# Patient Record
Sex: FEMALE | Race: WHITE
[De-identification: names, ages, dates, MRNs, and addresses within clinical notes are randomized per-mention and may not be internally consistent; named-entity substitution may affect disease eponyms.]

---

## 2019-01-10 NOTE — CT
CHEST CT SCAN WITH IV CONTRAST:

 

History: R91.1 lung nodule. Patient indicates lung nodule was diagnosed [place] ago [place]. No old studies available for comparison. 

 

FINDINGS: 

No mediastinal mass or adenopathy. Scattered small bolus emphysema changes, primarily in the upper lo
bes. Small pleural based nodule 0.4 cm in the left lower lobe. Minimal pleural based parenchymal carey
ges in the right middle lobe and lingula having more of a chronic appearance. No mediastinal mass or 
adenopathy. No pleural effusion or pericardial effusion. Several bilateral nonobstructing small renal
 calculi. 

 

IMPRESSION: 

0.4 cm diameter somewhat flat based, pleural based nodule in the left lower lobe. Small nonobstructin
g bilateral renal calculi. Minimal pleural based parenchymal change in the lingula and right middle l
obe, evidence for mild scarring. Numerous small bilateral upper lobe bullae. 

 

Consider a one year follow up examination in regards to the small pleural based nodule.

 

POS: [facility]

## 2019-01-28 NOTE — MMO
BILATERAL SCREENING MAMMOGRAM:

 

Indication: Annual exam. 

 

Comparison: 6-30-15

 

FINDINGS: 

This study is interpreted with the assistance of computer aided detection. 

 

The breast parenchyma demonstrates scattered fibroglandular elements. 

 

There are benign densities within the left breast which are stable. 

 

No new suspicious mass, cluster of microcalcification, or area of architectural distortion is evident
. 

 

IMPRESSION: 

BIRADS category 2 - benign. Recommend routine annual mammographic screening. 

 

POS: [facility]

## 2019-07-11 ENCOUNTER — HOSPITAL ENCOUNTER (OUTPATIENT)
Dept: HOSPITAL 92 - ERS | Age: 59
Setting detail: OBSERVATION
LOS: 1 days | Discharge: HOME | End: 2019-07-12
Attending: INTERNAL MEDICINE | Admitting: INTERNAL MEDICINE
Payer: COMMERCIAL

## 2019-07-11 VITALS — BODY MASS INDEX: 33.7 KG/M2

## 2019-07-11 DIAGNOSIS — K04.7: ICD-10-CM

## 2019-07-11 DIAGNOSIS — G89.29: ICD-10-CM

## 2019-07-11 DIAGNOSIS — J44.9: ICD-10-CM

## 2019-07-11 DIAGNOSIS — F32.9: ICD-10-CM

## 2019-07-11 DIAGNOSIS — Z88.8: ICD-10-CM

## 2019-07-11 DIAGNOSIS — M54.9: ICD-10-CM

## 2019-07-11 DIAGNOSIS — Z88.5: ICD-10-CM

## 2019-07-11 DIAGNOSIS — R07.89: Primary | ICD-10-CM

## 2019-07-11 DIAGNOSIS — F17.210: ICD-10-CM

## 2019-07-11 DIAGNOSIS — K21.9: ICD-10-CM

## 2019-07-11 DIAGNOSIS — I10: ICD-10-CM

## 2019-07-11 DIAGNOSIS — D75.1: ICD-10-CM

## 2019-07-11 DIAGNOSIS — Z79.2: ICD-10-CM

## 2019-07-11 LAB
ALBUMIN SERPL BCG-MCNC: 4.4 G/DL (ref 3.5–5)
ALP SERPL-CCNC: 81 U/L (ref 40–150)
ALT SERPL W P-5'-P-CCNC: 28 U/L (ref 8–55)
ANION GAP SERPL CALC-SCNC: 14 MMOL/L (ref 10–20)
AST SERPL-CCNC: 21 U/L (ref 5–34)
BASOPHILS # BLD AUTO: 0.1 THOU/UL (ref 0–0.2)
BASOPHILS NFR BLD AUTO: 0.9 % (ref 0–1)
BILIRUB SERPL-MCNC: 0.5 MG/DL (ref 0.2–1.2)
BUN SERPL-MCNC: 17 MG/DL (ref 9.8–20.1)
CALCIUM SERPL-MCNC: 9.9 MG/DL (ref 7.8–10.44)
CHLORIDE SERPL-SCNC: 108 MMOL/L (ref 98–107)
CK SERPL-CCNC: 136 U/L (ref 29–168)
CO2 SERPL-SCNC: 20 MMOL/L (ref 22–29)
CREAT CL PREDICTED SERPL C-G-VRATE: 0 ML/MIN (ref 70–130)
EOSINOPHIL # BLD AUTO: 0.1 THOU/UL (ref 0–0.7)
EOSINOPHIL NFR BLD AUTO: 0.6 % (ref 0–10)
GLOBULIN SER CALC-MCNC: 3 G/DL (ref 2.4–3.5)
GLUCOSE SERPL-MCNC: 124 MG/DL (ref 70–105)
HGB BLD-MCNC: 16.1 G/DL (ref 12–16)
LIPASE SERPL-CCNC: 28 U/L (ref 8–78)
LYMPHOCYTES # BLD: 2.2 THOU/UL (ref 1.2–3.4)
LYMPHOCYTES NFR BLD AUTO: 23.4 % (ref 21–51)
MCH RBC QN AUTO: 33 PG (ref 27–31)
MCV RBC AUTO: 101 FL (ref 78–98)
MONOCYTES # BLD AUTO: 0.8 THOU/UL (ref 0.11–0.59)
MONOCYTES NFR BLD AUTO: 8.7 % (ref 0–10)
NEUTROPHILS # BLD AUTO: 6.2 THOU/UL (ref 1.4–6.5)
NEUTROPHILS NFR BLD AUTO: 66.5 % (ref 42–75)
PLATELET # BLD AUTO: 314 THOU/UL (ref 130–400)
POTASSIUM SERPL-SCNC: 3.9 MMOL/L (ref 3.5–5.1)
RBC # BLD AUTO: 4.86 MILL/UL (ref 4.2–5.4)
SODIUM SERPL-SCNC: 138 MMOL/L (ref 136–145)
TROPONIN I SERPL DL<=0.01 NG/ML-MCNC: (no result) NG/ML (ref ?–0.03)
TROPONIN I SERPL DL<=0.01 NG/ML-MCNC: 0.01 NG/ML (ref ?–0.03)
WBC # BLD AUTO: 9.4 THOU/UL (ref 4.8–10.8)

## 2019-07-11 PROCEDURE — 83880 ASSAY OF NATRIURETIC PEPTIDE: CPT

## 2019-07-11 PROCEDURE — 78452 HT MUSCLE IMAGE SPECT MULT: CPT

## 2019-07-11 PROCEDURE — 82550 ASSAY OF CK (CPK): CPT

## 2019-07-11 PROCEDURE — 80053 COMPREHEN METABOLIC PANEL: CPT

## 2019-07-11 PROCEDURE — 94640 AIRWAY INHALATION TREATMENT: CPT

## 2019-07-11 PROCEDURE — 80061 LIPID PANEL: CPT

## 2019-07-11 PROCEDURE — 36415 COLL VENOUS BLD VENIPUNCTURE: CPT

## 2019-07-11 PROCEDURE — 94760 N-INVAS EAR/PLS OXIMETRY 1: CPT

## 2019-07-11 PROCEDURE — 80048 BASIC METABOLIC PNL TOTAL CA: CPT

## 2019-07-11 PROCEDURE — 84484 ASSAY OF TROPONIN QUANT: CPT

## 2019-07-11 PROCEDURE — A9500 TC99M SESTAMIBI: HCPCS

## 2019-07-11 PROCEDURE — 85025 COMPLETE CBC W/AUTO DIFF WBC: CPT

## 2019-07-11 PROCEDURE — 93017 CV STRESS TEST TRACING ONLY: CPT

## 2019-07-11 PROCEDURE — 93005 ELECTROCARDIOGRAM TRACING: CPT

## 2019-07-11 PROCEDURE — 71045 X-RAY EXAM CHEST 1 VIEW: CPT

## 2019-07-11 PROCEDURE — G0378 HOSPITAL OBSERVATION PER HR: HCPCS

## 2019-07-11 PROCEDURE — 83690 ASSAY OF LIPASE: CPT

## 2019-07-11 NOTE — HP
PRIMARY CARE PHYSICIAN:  Joceline Locke NP



CHIEF COMPLAINT:  "Sporadic high blood pressure."



HISTORY OF PRESENT ILLNESS:  This is a 58-year-old female with history of COPD,

GERD, back pain, tobacco use, who presents to the emergency room with a 
complaint of

elevated blood pressures sporadically along with chest pain and pressure.  The

patient reports that she was notified by her dentist 2 weeks ago that her blood

pressures were elevated.  Since then, she has had episodes of systolic pressure 
of

175 up to the 190s, intermittently associated with nausea, chest pain and 
pressure

in the center of her chest that radiates across her chest, left arm pain, and

shortness of breath.  She states these episodes of chest pressure lasts about 30

minutes, occur 4 to 5 times a day, and are improved with aspirin, which she is 
in

average taking it one time per day.  The pain/pressure is rated 3/10 in 
quality.  She denies any precipitating factors, denies

any other relieving factors.  She does state that one night she was unable to 
fall

asleep secondary to the pressure.  She denies any prior history of similar 
symptoms,

does state she had a cardiac evaluation 10 years ago and describes a 
catheterization

that she states was normal.  She also describes a stress test that she had

difficulty completing secondary to shortness of breath associated with her 
COPD. 



The patient denies any fevers, vomiting, or abdominal pain, denies any recent

coughing.  She is being treated for dental infections with amoxicillin and 
states

that the tooth pain has resolved.  She has had chills, headaches that she 
describes as

severe, lightheadedness, and dyspnea on exertion with hills or stairs. 



In the emergency room, the patient's presentation concerning for cardiac 
etiology of

the chest pain, she received aspirin 325 mg and hospitalist called for 
admission. 



ALLERGIES TO MEDICINE:  Codeine and Advair.  She states she had swollen vocal 
cords

associated with this.  In the emergency room chart, it states albuterol; however
,

she does state she has a ProAir inhaler and has no problems with it. 



CURRENT MEDICATIONS:  Reconciled with the list.  She reports she has been off 
these

medications for 3 weeks due to concerns of interactions with her dental 
medications. 

1. Esomeprazole 40 mg daily.

2. Spiriva daily.

3. Tizanidine 4 mg t.i.d.

4. Trazodone 50 mg at bedtime.

5. ProAir as needed, which she was using 4 times per day.

6. Her dental medications are amoxicillin 875 mg one-half tablet twice a day.

7. Tramadol, although she denies any need for this now.



PAST MEDICAL HISTORY:  

1. Lung nodule on the left side.

2. Dental problems including infection for which she is on amoxicillin.

3. COPD, uncertain of stage.

4. GERD.

5. Back pain.



PAST SURGICAL HISTORY:  

1. Hysterectomy.

2. Bladder suspension.



SOCIAL HISTORY:  She is , her  is her surrogate decision maker.  
She

is a full code.  She uses tobacco less than one pack per day and has been 
smoking

since the age of 13, she denies any recent marijuana use, although has used it 
in

the past.  She also denies any recent alcohol use. 



FAMILY HISTORY:  Diabetes, lung cancer, and high blood pressure.



REVIEW OF SYSTEMS:  Positive for chills, headache, dyspnea on exertion.  
Negative

for fevers, vomiting, abdominal pain, coughing, and any current dental pain.  
All

remaining review of systems are reviewed and negative.  Daily reflux symptoms. 



PHYSICAL EXAMINATION:

VITAL SIGNS:  Blood pressure is 174/107, pulse 74, respirations 16, sats 94% on 
room

air. 

GENERAL:  Awake, alert, responsive, in no apparent distress.  Able to speak in 
full

sentences. 

HEENT:  Her pupils are equal and round.  Oral mucosa is pink and moist. 

NECK:  Supple.  Nontender. 

LYMPHATICS:  No palpable cervical or supraclavicular lymphadenopathy. 

LUNGS:  Clear to auscultation bilateral. 

HEART:  Normal S1, S2.  Regular rate and rhythm.  No significant murmur. 

ABDOMEN:  Soft, present bowel sounds.  Nontender.  Nondistended. 

EXTREMITIES:  No pitting edema, clubbing, or cyanosis. 

SKIN:  No visible rashes. 

NEUROLOGIC:  No gross deficits. 

PSYCH:  Euthymic.  Alert and oriented x4. 

VASCULAR:  2+ dorsalis pedis pulses bilateral.  No audible carotid bruits.



KEY FINDINGS AND TEST RESULTS:  EKG; sinus rhythm, normal axis, normal intervals
, abnormal R-wave

progression, no ST changes. 



Chest x-ray is personally reviewed.  No acute findings. 



CBC; 9.4, 16.1, 49.2, 314. 



Chemistry; 138, 3.9, 108, 20, 17, 0.77, 124. 



LFTs negative. 



Troponin x2 are negative.



IMPRESSION:  

1. Chest pressure concerning for unstable angina in a patient with risk

factors of tobacco use and high blood pressure. 

2.  Hypertension - new diagnosis.

3. Chronic obstructive pulmonary disease, no signs of an acute exacerbation.

4. Dental infections, on antibiotic therapy.

5. Chronic back pain.

6. Gastroesophageal reflux disease.

7. Polycythemia, mild.



PLAN:  

1. Observation status in the hospital.

2. Telemetry monitoring.

3. We will initiate lisinopril for the elevated blood pressures, holding on a 
beta

blocker as a stress test will be ordered for tomorrow, with the caveat that the

patient does have COPD. Also start nitropaste for both symptoms management and 
blood pressure control.

4. We will also order echocardiogram to further evaluate the patient's cardiac

function.  If any tests are abnormal, we will need Cardiology consultation. 

5. Continuing the daily aspirin, check a lipid panel in the morning.

6. Obtain a third troponin.

7. We will resume her PPI, order Spiriva and p.r.n. albuterol.

8. We will hold her other medications for now except for her amoxicillin for the

dental infection. 

9. DVT prophylaxis.  She is ambulatory.

10. GI prophylaxis not indicated.

11. Code status is full.  Surrogate decision maker is the patient's .

12. Tobacco cessation counseling prior to discharge.

13. Anticipated length of stay is for the above workup, which is anticipated 
less

than 48 hours. 



The patient is at high risk given age, comorbidities, and current presentation.



Reviewed the plan of care with the patient who demonstrates understanding, no

questions or further needs at the end of evaluation. 







Job ID:  [id number]



[facility] HospitalDECLAN

## 2019-07-11 NOTE — RAD
PA CHEST:

 

Date:  07/11/19 

 

HISTORY:  

Chest pain. 

 

FINDINGS:

Lung fields are clear. Heart and mediastinum appear normal. 

 

IMPRESSION: 

No acute findings.  

 

POS: [facility]

## 2019-07-12 VITALS — TEMPERATURE: 97.9 F | DIASTOLIC BLOOD PRESSURE: 81 MMHG | SYSTOLIC BLOOD PRESSURE: 141 MMHG

## 2019-07-12 LAB
ANION GAP SERPL CALC-SCNC: 12 MMOL/L (ref 10–20)
BUN SERPL-MCNC: 21 MG/DL (ref 9.8–20.1)
CALCIUM SERPL-MCNC: 9.1 MG/DL (ref 7.8–10.44)
CHD RISK SERPL-RTO: 5.7 (ref ?–4.5)
CHLORIDE SERPL-SCNC: 107 MMOL/L (ref 98–107)
CHOLEST SERPL-MCNC: 198 MG/DL
CO2 SERPL-SCNC: 24 MMOL/L (ref 22–29)
CREAT CL PREDICTED SERPL C-G-VRATE: 132 ML/MIN (ref 70–130)
GLUCOSE SERPL-MCNC: 103 MG/DL (ref 70–105)
HDLC SERPL-MCNC: 35 MG/DL
LDLC SERPL CALC-MCNC: 142 MG/DL
POTASSIUM SERPL-SCNC: 3.8 MMOL/L (ref 3.5–5.1)
SODIUM SERPL-SCNC: 139 MMOL/L (ref 136–145)
TRIGL SERPL-MCNC: 103 MG/DL (ref ?–150)

## 2019-07-12 RX ADMIN — IPRATROPIUM BROMIDE SCH: 0.5 SOLUTION RESPIRATORY (INHALATION) at 13:04

## 2019-07-12 RX ADMIN — IPRATROPIUM BROMIDE SCH ML: 0.5 SOLUTION RESPIRATORY (INHALATION) at 00:10

## 2019-07-12 RX ADMIN — NITROGLYCERIN SCH: 20 OINTMENT TOPICAL at 06:04

## 2019-07-12 RX ADMIN — IPRATROPIUM BROMIDE SCH ML: 0.5 SOLUTION RESPIRATORY (INHALATION) at 07:08

## 2019-07-12 RX ADMIN — NITROGLYCERIN SCH: 20 OINTMENT TOPICAL at 14:27

## 2019-07-12 NOTE — NM
EXAM: CARDIAC SPECT



HISTORY: Chest pain



TECHNIQUE: A myocardial perfusion scan was performed using the single isotope 1 day protocol with davon
hnetium 99m sestamibi. [10 mCi] was injected intravenously for the rest exam followed by 30 mCi for

the stress study. 



Pharmacologic stress with Lexiscan was monitored and interpreted by FILOMENA Jason



FINDINGS: Homogeneous tracer distribution is seen in the myocardial segments on stress and rest image
s without fixed or reversible defects.





Gated SPECT LVEF: 63%



Wall motion exam: Normal





IMPRESSION: Normal myocardial perfusion scan



Reported By: Vahe Bender 

Electronically Signed:  7/12/2019 12:40 PM

## 2019-07-22 ENCOUNTER — HOSPITAL ENCOUNTER (OUTPATIENT)
Dept: HOSPITAL 92 - ULT | Age: 59
Discharge: HOME | End: 2019-07-22
Attending: NURSE PRACTITIONER
Payer: COMMERCIAL

## 2019-07-22 DIAGNOSIS — I07.1: ICD-10-CM

## 2019-07-22 DIAGNOSIS — R07.9: Primary | ICD-10-CM

## 2019-07-22 PROCEDURE — 93306 TTE W/DOPPLER COMPLETE: CPT

## 2019-11-14 ENCOUNTER — HOSPITAL ENCOUNTER (EMERGENCY)
Dept: HOSPITAL 92 - SCSER | Age: 59
Discharge: HOME | End: 2019-11-14
Payer: COMMERCIAL

## 2019-11-14 DIAGNOSIS — S00.11XA: ICD-10-CM

## 2019-11-14 DIAGNOSIS — S00.83XA: ICD-10-CM

## 2019-11-14 DIAGNOSIS — J44.9: ICD-10-CM

## 2019-11-14 DIAGNOSIS — S80.02XA: ICD-10-CM

## 2019-11-14 DIAGNOSIS — F32.9: ICD-10-CM

## 2019-11-14 DIAGNOSIS — S80.01XA: ICD-10-CM

## 2019-11-14 DIAGNOSIS — F17.210: ICD-10-CM

## 2019-11-14 DIAGNOSIS — S06.0X1A: Primary | ICD-10-CM

## 2019-11-14 DIAGNOSIS — S00.12XA: ICD-10-CM
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
## 2019-11-14 DIAGNOSIS — S22.32XA: ICD-10-CM

## 2019-11-14 PROCEDURE — 70450 CT HEAD/BRAIN W/O DYE: CPT

## 2019-11-14 PROCEDURE — 71046 X-RAY EXAM CHEST 2 VIEWS: CPT

## 2019-11-14 NOTE — CT
CT BRAIN NONCONTRAST:

 

DATE:  11/14/19 

 

HISTORY: 

58-year-old female status post acute head trauma from fall. 

 

FINDINGS:

The ventricles are normal in size and configuration.  There is no midline shift or any other mass eff
ect.  There is no evidence of acute intracranial hemorrhage, large cortical infarct, or extraaxial fl
uid collection.  The gray matter /white matter differentiation is maintained.  The calvarium is intac
t.  The tympanomastoid cavities, and the upper portions of the paranasal sinuses included in these im
ages, are grossly clear.

 

IMPRESSION: 

Normal.

 

 

jn [] 

 

POS: [facility]

## 2019-11-14 NOTE — RAD
XR Chest Pa   Lat STANDARD



HISTORY: Fall, left-sided chest pain



COMPARISON: None



FINDINGS: The heart size is normal. The lungs are well expanded without focal areas of consolidation,
 pneumothorax or pleural effusions.

No definite acute osseous abnormalities are seen.



IMPRESSION: No radiographic evidence of acute cardiopulmonary process.



Reported By: Vahe Bender 

Electronically Signed:  11/14/2019 11:53 AM

## 2020-01-13 ENCOUNTER — HOSPITAL ENCOUNTER (OUTPATIENT)
Dept: HOSPITAL 18 - NAV RAD | Age: 60
Discharge: HOME | End: 2020-01-13
Payer: COMMERCIAL

## 2020-01-13 DIAGNOSIS — J18.9: Primary | ICD-10-CM

## 2020-01-13 PROCEDURE — 71046 X-RAY EXAM CHEST 2 VIEWS: CPT

## 2020-01-13 NOTE — RAD
RADIOGRAPH CHEST 2 VIEWS:

 

DATE:  01/13/2020

 

HISTORY: 

59-year-old female with ICD-10:  J18.9 pneumonia of both lower lobes. 

 

FINDINGS:

There is no air space density, pulmonary edema, pleural effusion, pneumothorax, or cardiomegaly.

 

IMPRESSION: 

No acute cardiopulmonary findings.

 

 

jn [] 

 

POS: [facility]

## 2020-02-12 ENCOUNTER — HOSPITAL ENCOUNTER (OUTPATIENT)
Dept: HOSPITAL 92 - SCSCT | Age: 60
Discharge: HOME | End: 2020-02-12
Attending: NURSE PRACTITIONER
Payer: COMMERCIAL

## 2020-02-12 DIAGNOSIS — I10: Primary | ICD-10-CM

## 2020-02-12 DIAGNOSIS — R91.1: ICD-10-CM

## 2020-02-12 LAB — ESTIMATED GFR-MDRD - POC: (no result)

## 2020-02-12 PROCEDURE — 82565 ASSAY OF CREATININE: CPT

## 2020-02-12 PROCEDURE — 71260 CT THORAX DX C+: CPT

## 2020-02-12 NOTE — CT
CHEST CT WITH CONTRAST:



HISTORY: 

Followup lung nodules.



COMPARISON: 

1/10/2019.



FINDINGS:

Mediastinum: No mass, lymphadenopathy or hematoma.



Heart: Normal heart size. No significant pericardial fluid.



Aorta: Normal caliber. No periaortic fat stranding.



Axilla and lower neck: No evidence of lymphadenopathy or mass. Thyroid gland is unremarkable.



Visualized upper solid abdominal viscera demonstrates heterogeneous attenuation of the liver suggesti
ng areas of fatty infiltration and fatty sparing. 



Nonspecific calcifications in the left or right renal pelvis.



Trachea and central bronchi are patent.



Pleural spaces: No significant pleural effusion or pneumothorax.



Right lung: Mild emphysematous changes. No suspicious masses, consolidation or nodules.



Left lung: No suspicious masses, nodules or consolidation in the left upper lobe. Stable pleural-base
d 0.4 cm solid nodule along the posterior left lower lobe. Previously, this nodule also measured

0.4 cm.



IMPRESSION:

Essentially stable 0.4 cm pleural-based nodule adjacent to the left lower lobe. No significant change
 for one year. Followup imaging and one year is recommended to document 2 years of stability.



Transcribed Date/Time: 2/12/2020 1:04 PM



Reported By: Britt Price 

Electronically Signed:  2/12/2020 2:33 PM